# Patient Record
Sex: MALE | Race: OTHER | Employment: STUDENT | URBAN - METROPOLITAN AREA
[De-identification: names, ages, dates, MRNs, and addresses within clinical notes are randomized per-mention and may not be internally consistent; named-entity substitution may affect disease eponyms.]

---
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
## 2021-02-22 ENCOUNTER — HOSPITAL ENCOUNTER (OUTPATIENT)
Age: 21
Discharge: HOME | End: 2021-02-22
Payer: COMMERCIAL

## 2021-02-22 VITALS
RESPIRATION RATE: 20 BRPM | TEMPERATURE: 98 F | HEART RATE: 81 BPM | SYSTOLIC BLOOD PRESSURE: 142 MMHG | WEIGHT: 250 LBS | HEIGHT: 69 IN | BODY MASS INDEX: 37.03 KG/M2 | OXYGEN SATURATION: 99 % | DIASTOLIC BLOOD PRESSURE: 100 MMHG

## 2021-02-22 DIAGNOSIS — U07.1 COVID-19 VIRUS DETECTED: ICD-10-CM

## 2021-02-22 DIAGNOSIS — Z20.822 ENCOUNTER FOR LABORATORY TESTING FOR COVID-19 VIRUS: Primary | ICD-10-CM

## 2021-02-22 LAB — SARS-COV-2 RNA RESP QL NAA+PROBE: DETECTED

## 2021-02-22 PROCEDURE — U0002 COVID-19 LAB TEST NON-CDC: HCPCS | Performed by: NURSE PRACTITIONER

## 2021-02-22 PROCEDURE — 99202 OFFICE O/P NEW SF 15 MIN: CPT | Performed by: NURSE PRACTITIONER

## 2021-02-22 NOTE — ED PROVIDER NOTES
Patient Seen in: Immediate [address] [place], [place] [address]way      History   Patient presents with:  Runny Nose    Stated Complaint: COVID TEST / Emmalene Billet / RUNNY NOSE / EXPOSURE    HPI/Subjective:   14-year-old male presents the immediate care for runny nose, fatigue Rhythm: Normal rate. Pulmonary:      Effort: Pulmonary effort is normal.      Breath sounds: Normal breath sounds. Abdominal:      General: Abdomen is flat. Musculoskeletal: Normal range of motion. Skin:     General: Skin is warm and dry.    Neurolo

## 2021-02-22 NOTE — ED INITIAL ASSESSMENT (HPI)
Pt here for covid testing. Pt states he's had a runny nose for a few days. Pt also states his brother is covid positive.